# Patient Record
Sex: FEMALE | Race: WHITE
[De-identification: names, ages, dates, MRNs, and addresses within clinical notes are randomized per-mention and may not be internally consistent; named-entity substitution may affect disease eponyms.]

---

## 2024-03-23 PROBLEM — Z00.00 ENCOUNTER FOR PREVENTIVE HEALTH EXAMINATION: Status: ACTIVE | Noted: 2024-03-23

## 2024-03-28 ENCOUNTER — APPOINTMENT (OUTPATIENT)
Dept: PLASTIC SURGERY | Facility: CLINIC | Age: 69
End: 2024-03-28
Payer: COMMERCIAL

## 2024-03-28 ENCOUNTER — NON-APPOINTMENT (OUTPATIENT)
Age: 69
End: 2024-03-28

## 2024-03-28 VITALS
TEMPERATURE: 97.6 F | WEIGHT: 102 LBS | OXYGEN SATURATION: 98 % | HEART RATE: 97 BPM | HEIGHT: 63 IN | BODY MASS INDEX: 18.07 KG/M2 | DIASTOLIC BLOOD PRESSURE: 94 MMHG | SYSTOLIC BLOOD PRESSURE: 152 MMHG

## 2024-03-28 DIAGNOSIS — Z86.79 PERSONAL HISTORY OF OTHER DISEASES OF THE CIRCULATORY SYSTEM: ICD-10-CM

## 2024-03-28 DIAGNOSIS — Z42.8 ENCOUNTER FOR OTHER PLASTIC AND RECONSTRUCTIVE SURGERY FOLLOWING MEDICAL PROCEDURE OR HEALED INJURY: ICD-10-CM

## 2024-03-28 DIAGNOSIS — Z83.3 FAMILY HISTORY OF DIABETES MELLITUS: ICD-10-CM

## 2024-03-28 DIAGNOSIS — Z82.49 FAMILY HISTORY OF ISCHEMIC HEART DISEASE AND OTHER DISEASES OF THE CIRCULATORY SYSTEM: ICD-10-CM

## 2024-03-28 DIAGNOSIS — Z87.891 PERSONAL HISTORY OF NICOTINE DEPENDENCE: ICD-10-CM

## 2024-03-28 PROCEDURE — 99203 OFFICE O/P NEW LOW 30 MIN: CPT

## 2024-03-28 NOTE — ASSESSMENT
[FreeTextEntry1] : 69 y/o female for consultation    Forehead Mohs surgery is planned. We discussed the usual sequence of events, which is to have the Mohs surgery on one day, and then reconstruction later that day or the next day. We discussed the general concept of Mohs, and how the defect size and the time needed to completely clear the area of cancer cells is unpredictable.   We discussed having multiple different options available, from healing by secondary intent (just wound care, without reconstruction) to primary closure, to local flap surgery to skin grafting. We discussed the risks of poor wound healing and scarring on the face as well.   She was given an opportunity to ask questions and all of his questions were answered. She wishes to proceed.  Plan and patient status as per Dr Milner.

## 2024-03-28 NOTE — ADDENDUM
[FreeTextEntry1] :  All medical record entries were made at my direction (Dr. Anna Milner). I have reviewed the chart and agree that the record accurately reflects the history, physical exam, assessment and plan. Changes noted above. Will likely be able to excise Burow's triangles and close, but this depends on the size and shape of the defect. We also discussed the possibility of abnormal frontalis movement. She was given an opportunity to ask questions and all of her questions were answered. She wishes to proceed.

## 2024-03-28 NOTE — REASON FOR VISIT
[Consultation] : a consultation visit [FreeTextEntry1] : patient states she is here regarding mohs on her forehead.

## 2024-03-28 NOTE — HISTORY OF PRESENT ILLNESS
[FreeTextEntry1] : "I am having Mohs surgery"  Patient presents for consultation and states that she had a biopsy performed that showed that she had basal cell carcinoma on her forehead.  She has questions about the procedure.  She has no complaints today.

## 2024-03-28 NOTE — PHYSICAL EXAM
[NI] : Normal [de-identified] : Small biopsy scar pink and healed on forehead.   [de-identified] : Small biopsy scar: about 3-4 mm, pink and healed on forehead.   Surrounding skin is mobile and soft. No adherence to the underlying bone. [de-identified] : NL respiratory effort noted

## 2024-04-02 ENCOUNTER — NON-APPOINTMENT (OUTPATIENT)
Age: 69
End: 2024-04-02

## 2024-05-13 ENCOUNTER — APPOINTMENT (OUTPATIENT)
Dept: PLASTIC SURGERY | Facility: CLINIC | Age: 69
End: 2024-05-13
Payer: COMMERCIAL

## 2024-05-13 VITALS
WEIGHT: 103 LBS | HEART RATE: 66 BPM | TEMPERATURE: 97.6 F | SYSTOLIC BLOOD PRESSURE: 156 MMHG | HEIGHT: 63 IN | DIASTOLIC BLOOD PRESSURE: 88 MMHG | BODY MASS INDEX: 18.25 KG/M2 | OXYGEN SATURATION: 98 %

## 2024-05-13 PROCEDURE — 13131 CMPLX RPR F/C/C/M/N/AX/G/H/F: CPT

## 2024-05-13 NOTE — PROCEDURE
[Nl] : None [FreeTextEntry1] : Mohs defect of forehead. [FreeTextEntry2] : Complex closure of forehead, 2 cm. [FreeTextEntry6] : Following informed consent and marking, the forehead site was infiltrated with 1% lidocaine with epi. Time was allowed to pass for vasoconstriction. The wound and periwound area were prepped with povidone iodine. The incisions were made with a 15 blade scalpel. This was carried through the dermis into the subcutaneous tissue. The Mohs wound was excised completely. The wound was carefully examined for hemostasis. The closure was then begun.   The subcutaneous tissue and dermis was closed using interrupted 5-0 Vicryl sutures.  The skin was then closed with interrupted simple 6-0 Nylon sutures. The incision was dressed with petrolatum.  Pt was given written wound care and follow up instructions. Use ointment on the suture line TID. May cover with gauze if desired. May shower tomorrow. Follow up in one week. Call for any questions or concerns. [FreeTextEntry7] : None (margins cleared by Mohs this morning).

## 2024-05-13 NOTE — REASON FOR VISIT
[Procedure: _________] : a [unfilled] procedure visit [FreeTextEntry1] : patient is here for reconstruction after Mohs procedure.

## 2024-05-22 ENCOUNTER — APPOINTMENT (OUTPATIENT)
Dept: PLASTIC SURGERY | Facility: CLINIC | Age: 69
End: 2024-05-22
Payer: COMMERCIAL

## 2024-05-22 VITALS
HEART RATE: 74 BPM | WEIGHT: 102 LBS | TEMPERATURE: 98.4 F | SYSTOLIC BLOOD PRESSURE: 138 MMHG | HEIGHT: 63 IN | OXYGEN SATURATION: 98 % | BODY MASS INDEX: 18.07 KG/M2 | DIASTOLIC BLOOD PRESSURE: 82 MMHG

## 2024-05-22 DIAGNOSIS — Z85.828 PERSONAL HISTORY OF OTHER MALIGNANT NEOPLASM OF SKIN: ICD-10-CM

## 2024-05-22 DIAGNOSIS — M95.2 OTHER ACQUIRED DEFORMITY OF HEAD: ICD-10-CM

## 2024-05-22 DIAGNOSIS — Z98.890 OTHER ACQUIRED DEFORMITY OF HEAD: ICD-10-CM

## 2024-05-22 DIAGNOSIS — Z09 ENCOUNTER FOR FOLLOW-UP EXAMINATION AFTER COMPLETED TREATMENT FOR CONDITIONS OTHER THAN MALIGNANT NEOPLASM: ICD-10-CM

## 2024-05-22 DIAGNOSIS — L90.5 SCAR CONDITIONS AND FIBROSIS OF SKIN: ICD-10-CM

## 2024-05-22 PROCEDURE — 99024 POSTOP FOLLOW-UP VISIT: CPT

## 2024-05-22 NOTE — REASON FOR VISIT
[Follow-Up: _____] : a [unfilled] follow-up visit [FreeTextEntry1] : patient states the incision seems to be healing well and has no complaints.

## 2024-05-22 NOTE — PHYSICAL EXAM
[NI] : Normal [de-identified] : Forehead incision healing well. All sutures removed without difficulty. [de-identified] : NL respiratory effort noted

## 2024-05-22 NOTE — ASSESSMENT
[FreeTextEntry1] : Doing well s/p Mohs closure. Pt was given written scar care instructions that were reviewed verbally, as follows:  Scars tend to heal fairly quickly (in 4-14 days depending on the site), then mature slowly over time. The vast majority of improvement occurs in the first 2-3 months, with the pink/red scar becoming softer, more pliable and lighter in color (depending upon skin tone). At 6 months, the scar appears lighter and at approximately one year, the scar reaches maximum improvement (with a range from 9-18 months). Scar care should include skin moisturizer, sunscreen, and if desired, silicone gel.  Silicone gel use should begin after all scabs and crust are gone and the scars are smooth and dry, usually about 3 weeks. The silicone gel should be used twice a day for a treatment course of three months to achieve benefit. Sunscreen and/or makeup can be applied after the silicone gel has been applied and allowed to dry. If necessary, scar revision can be performed after 6 months (even better to wait 9-12 months). This can significantly improve some scars, but requires going through the healing and maturing process again from the beginning.   Follow up in 3 months.

## 2024-05-22 NOTE — HISTORY OF PRESENT ILLNESS
[FreeTextEntry1] : Pt reports she is doing very well. Was using Vaseline plus a bandaid to cover the sutures.

## 2024-08-21 ENCOUNTER — APPOINTMENT (OUTPATIENT)
Dept: PLASTIC SURGERY | Facility: CLINIC | Age: 69
End: 2024-08-21

## 2024-08-21 VITALS
DIASTOLIC BLOOD PRESSURE: 80 MMHG | HEIGHT: 63 IN | BODY MASS INDEX: 18.07 KG/M2 | WEIGHT: 102 LBS | SYSTOLIC BLOOD PRESSURE: 132 MMHG | TEMPERATURE: 98.2 F | HEART RATE: 83 BPM | OXYGEN SATURATION: 98 %

## 2024-08-21 DIAGNOSIS — Z98.890 OTHER ACQUIRED DEFORMITY OF HEAD: ICD-10-CM

## 2024-08-21 DIAGNOSIS — M95.2 OTHER ACQUIRED DEFORMITY OF HEAD: ICD-10-CM

## 2024-08-21 PROCEDURE — 99212 OFFICE O/P EST SF 10 MIN: CPT

## 2024-08-21 NOTE — PHYSICAL EXAM
[NI] : Normal [de-identified] : Scar has matured, thin and light pink in color, nontender and soft.

## 2024-08-21 NOTE — ASSESSMENT
[FreeTextEntry1] : 67 y/o female for follow up    Healed well She is to continue another 3 months of silicone scar care. Follow up in three months All questions and concerns addressed Plan and patient status as per Dr Milner.

## 2024-08-21 NOTE — HISTORY OF PRESENT ILLNESS
[FreeTextEntry1] : Patient presents for follow up and states that she is so happy with the results of her surgery.  She has been using silicone gel twice a day for three months and states she notices a difference.  She has been using sunblock as well and refraining from going out in the sun as well.  She has no complaints.

## 2024-08-21 NOTE — REASON FOR VISIT
[Follow-Up: _____] : a [unfilled] follow-up visit [FreeTextEntry1] : patient states everything is good and has no complaints.

## 2024-11-14 ENCOUNTER — APPOINTMENT (OUTPATIENT)
Dept: PLASTIC SURGERY | Facility: CLINIC | Age: 69
End: 2024-11-14